# Patient Record
(demographics unavailable — no encounter records)

---

## 2025-01-29 NOTE — DISCUSSION/SUMMARY
[FreeTextEntry1] : 47 yo for [employer] -f/u pap, HPV, aptima -rx given terconazole and nystatin, advised barrier cream

## 2025-01-29 NOTE — HISTORY OF PRESENT ILLNESS
[FreeTextEntry1] : 49 yo for [employer], Reports she was sick several weeks ago with cough that caused stress incontinence and required her to wear a pad daily. Afterward report burning, pain and irritation in vulva and groin. She used OTC antifungal cream without improvement.  Periods are irregular, she is taking norethindrone daily.  Last mammogram 12/2024 BIRADS 1   H/o left dermoid s/p cystectomy in 2018. Reports h/o PCOS, AUB, HMB and dysmenorrhea. She had a pelvic MRI 4/2023 for pelvic pain showing small fibroids <2cm and likely adenomyosis. She has been taking norethindrone 10 mg PO QD since 2018 for AUB. Prior to that she had an endometrial polypectomy and D&C with benign tissue.

## 2025-01-29 NOTE — PHYSICAL EXAM
[Chaperone Present] : A chaperone was present in the examining room during all aspects of the physical examination [67419] : A chaperone was present during the pelvic exam. [Appropriately responsive] : appropriately responsive [Labia Majora] : normal [Labia Minora] : normal [Normal] : normal [FreeTextEntry1] : erythema